# Patient Record
Sex: MALE | Race: OTHER | Employment: OTHER | URBAN - METROPOLITAN AREA
[De-identification: names, ages, dates, MRNs, and addresses within clinical notes are randomized per-mention and may not be internally consistent; named-entity substitution may affect disease eponyms.]

---

## 2020-07-13 NOTE — PATIENT DISCUSSION
This visual field clearly demonstrated a minimum of 43% loss of upper field of vision OU, with upper lid skin in repose and elevated by taping of the lid to demonstrate potential correction. This field shows that taping the lids significantly improved this patient's superior field of vision by approximately 43%, OU.

## 2020-08-12 NOTE — PATIENT DISCUSSION
Patient informed of available non-opioid medications and other non-pharmacological options. Discussed advantages and disadvantages of the alternatives, including whether the patient is at-risk for, or has a history of, controlled substance abuse or misuse, and the patient’s personal preferences. [facility] “Opioid Alternative Pamphlet” was provided to patient.

## 2020-08-25 NOTE — PATIENT DISCUSSION
Dispense and Trial Travataluz NEGRETE [facility] ou.  Reviewed Pamela NEGRETE with patient.

## 2020-08-25 NOTE — PATIENT DISCUSSION
Patient informed of available non-opioid medications and other non-pharmacological options. Discussed advantages and disadvantages of the alternatives, including whether the patient is at-risk for, or has a history of, controlled substance abuse or misuse, and the patient’s personal preferences. [facility] “Opioid Alternative Pamphlet” was provided to patient.

## 2020-08-25 NOTE — PATIENT DISCUSSION
Dispense and Trial Travataluz NEGRETE [facility] ou.  Reviewed Pamela NEGRETE with patient.

## 2020-08-26 NOTE — PATIENT DISCUSSION
Dispense and Trial Travataluz NEGRETE [facility] ou.  Reviewed Pamela NEGRETE with patient.

## 2020-09-01 NOTE — PATIENT DISCUSSION
Dispense and Trial Travataluz NEGRETE [facility] ou.  Reviewed Pamela NEGRETE with patient.

## 2021-08-17 NOTE — PATIENT DISCUSSION
Blepharoplasty Recommended.
Cataract symptoms i.e., glare, blur discussed. Pt to call if worsening vision or trouble with driving, TV, reading, ADL. UV precautions. Reviewed possibility of future cataract surgery.
Consult with Dr. Juve carrera.
Discussed condition and exacerbating conditions/situations (e.g., dry/arid environments, overhead fans, air conditioners, side effect of medications).
Discussed importance of compliance with ocular medications and follow up exams to prevent loss of vision.
Discussed lid hygiene, warm compress and eyelid wash.
Dispense and Trial Travataluz NEGRETE [facility] ou.  Reviewed Pamela NEGRETE with patient.
IOP is outside normal range in morning OU.
Instructed to call immediately if any new distortion, blurring, decreased vision or eye pain.
Monitor.
Patient made aware of 24/7 emergency services.
Patient understands condition, prognosis and need for follow up care.
See Ocular meds.
Stable.
yes...

## 2021-10-10 NOTE — PATIENT DISCUSSION
Blepharoplasty Recommended. Pt requested discharge instructions, informed MD would like to admit him for lab abnormalities and further workup. Pt angry, dressed self and began walking out of dept. PIV removed prior to departure./Walking

## 2022-04-14 ENCOUNTER — NEW PATIENT (OUTPATIENT)
Dept: URBAN - METROPOLITAN AREA CLINIC 43 | Facility: CLINIC | Age: 67
End: 2022-04-14

## 2022-04-14 DIAGNOSIS — H43.813: ICD-10-CM

## 2022-04-14 DIAGNOSIS — H25.9: ICD-10-CM

## 2022-04-14 DIAGNOSIS — H52.203: ICD-10-CM

## 2022-04-14 PROCEDURE — 92004 COMPRE OPH EXAM NEW PT 1/>: CPT

## 2022-04-14 PROCEDURE — 92015 DETERMINE REFRACTIVE STATE: CPT

## 2022-04-14 ASSESSMENT — KERATOMETRY
OS_K2POWER_DIOPTERS: 41.25
OS_K1POWER_DIOPTERS: 40.75
OD_AXISANGLE2_DEGREES: 180
OD_K1POWER_DIOPTERS: 41.00
OS_AXISANGLE_DEGREES: 89
OS_AXISANGLE2_DEGREES: 179
OD_K2POWER_DIOPTERS: 41.05
OD_AXISANGLE_DEGREES: 90

## 2022-04-14 ASSESSMENT — VISUAL ACUITY
OU_CC: J1
OS_CC: J2
OS_BAT: 20/40
OD_CC: J1
OD_SC: 20/40-1
OD_BAT: 20/30
OD_CC: 20/25+2
OU_CC: 20/20-2
OU_SC: 20/40
OS_SC: 20/40-2
OS_CC: 20/30-2

## 2022-04-14 ASSESSMENT — TONOMETRY
OS_IOP_MMHG: 15
OD_IOP_MMHG: 16

## 2023-04-19 ENCOUNTER — COMPREHENSIVE EXAM (OUTPATIENT)
Dept: URBAN - METROPOLITAN AREA CLINIC 43 | Facility: CLINIC | Age: 68
End: 2023-04-19

## 2023-04-19 DIAGNOSIS — H43.813: ICD-10-CM

## 2023-04-19 DIAGNOSIS — H52.203: ICD-10-CM

## 2023-04-19 DIAGNOSIS — H25.9: ICD-10-CM

## 2023-04-19 PROCEDURE — 92014 COMPRE OPH EXAM EST PT 1/>: CPT

## 2023-04-19 PROCEDURE — 92015 DETERMINE REFRACTIVE STATE: CPT

## 2023-04-19 ASSESSMENT — VISUAL ACUITY
OD_SC: J1
OD_CC: J1
OD_SC: 20/50-2
OS_CC: 20/30-1
OD_CC: 20/25+2
OS_SC: 20/40-1

## 2023-04-19 ASSESSMENT — KERATOMETRY
OD_AXISANGLE2_DEGREES: 180
OD_AXISANGLE_DEGREES: 90
OS_AXISANGLE2_DEGREES: 179
OS_AXISANGLE_DEGREES: 89
OD_K1POWER_DIOPTERS: 41.00
OS_K1POWER_DIOPTERS: 40.75
OS_K2POWER_DIOPTERS: 41.25
OD_K2POWER_DIOPTERS: 41.05

## 2023-04-19 ASSESSMENT — TONOMETRY
OS_IOP_MMHG: 14
OD_IOP_MMHG: 15

## 2024-12-23 ENCOUNTER — COMPREHENSIVE EXAM (OUTPATIENT)
Age: 69
End: 2024-12-23

## 2024-12-23 DIAGNOSIS — H43.813: ICD-10-CM

## 2024-12-23 DIAGNOSIS — H52.203: ICD-10-CM

## 2024-12-23 DIAGNOSIS — H25.9: ICD-10-CM

## 2024-12-23 PROCEDURE — 92015 DETERMINE REFRACTIVE STATE: CPT

## 2024-12-23 PROCEDURE — 92014 COMPRE OPH EXAM EST PT 1/>: CPT

## 2025-06-24 NOTE — PATIENT DISCUSSION
6/24/2025      Barron Gtz  [address]  [facility] [address]      Dear Colleague,    Thank you for referring your patient, Barron Gtz, to the [facility]. Please see a copy of my visit note below.    [facility] Dermatology Note  Encounter Date: Jun 24, 2025  Office Visit     Dermatology Problem List:  1. Plaque psoriasis (PSA monitored through rheum-Taltz)  - Current Tx: triamcinolone 0.1% ointment, fluocinonide 0.05% solution, Taltz 80 mg/mL injections q14 days (through rheum)  - Past Tx: Humira   2. Onychodystrophy    MHx: Liver cirrhosis.  ____________________________________________    Assessment & Plan:    # Plaque psoriasis. Improved from last visit-cleared, chronic, well controlled.   - Continue Taltz injection every 14 days. (Through rheum)  - Continue triamcinolone 0.1% ointment BID, prn  - Continue fluocinonide 0.05% solution to scalp BID, prn  - Sun protection was discussed and provided with a handout discussing recommended mineral sunscreens (zinc oxide and titanium dioxide) as well as sun protective clothing.   - labs ordered today: Quantiferon-TB      # Psoriatic arthritis, chronic   - continue to follow up with rheumatology.  - continue Taltz 80 mg/mL injections q14 days (rx through rheumatology)    # Xerosis Cutis, chronic.   - discussed gentle skin care including minimal soap use,   - start Aquaphor external ointment daily      # Purpura, arms, acute.   -Likely secondary to low platelet 2nd to chronic liver disease (reviewed labs 6/6/25)    Procedures Performed:   N/A    Follow-up: 1 year in-person, or earlier for new or changing lesions    Staff and Scribe:   Scribe Disclosure:   By signing my name below, IMarisol, attest that this documentation has been prepared under the direction and in the presence of Chiqui Schulz PA-C.  - Electronically Signed: Marisol Soliz 06/24/25       Provider Disclosure:   The documentation  Healing well. recorded by the scribe accurately reflects the services I personally performed and the decisions made by me.    All risks, benefits and alternatives were discussed with patient.  Patient is in agreement and understands the assessment and plan.  All questions were answered.  Sun Screen Education was given.   Return to Clinic annually or sooner as needed.   Chiqui Schulz PA-C   [facility]        ____________________________________________    CC: Psoriasis (Psoriasis follow up pt states things are going well)    HPI:  Mr. Barron Gtz is a(n) 54 year old male who presents today for follow-up  for psoriasis.     Patient reports a few crusty areas that are bothersome. He has been compliant withe topicals, and uses the aquaphor for his dry skin, noting it is very helpful.    Patient is otherwise feeling well, without additional skin concerns.    Labs Reviewed:  reviewed labs 6/6/25, CBC, bmp    Physical exam:  Vitals: There were no vitals taken for this visit.  GEN: This is a well developed, well-nourished male in no acute distress, in a pleasant mood.    SKIN: Total skin excluding the undergarment areas was performed. The exam included the head/face, neck, both arms, chest, back, abdomen, both legs, digits and/or nails.   - few violaceous macules on forearms  - areas of telangiectasias on upper arms and central back  - xerosis noted to face, trunk, extremities  - no psoriatic plaques noted  - No other lesions of concern on areas examined.         Medications:  Current Outpatient Medications   Medication Sig Dispense Refill     ixekizumab (TALTZ) 80 MG/ML SOAJ auto-injector Inject 1 mL (80 mg) subcutaneously every 14 days. 2 mL 5     spironolactone (ALDACTONE) 100 MG tablet Take 1 tablet (100 mg) by mouth 2 times daily. 180 tablet 3     amLODIPine (NORVASC) 2.5 MG tablet Take 2.5 mg by mouth every morning.       bisacodyl (DULCOLAX) 5 MG EC tablet Two days prior to exam take two (2)  tablets at 4pm. One day prior to exam take two (2) tablets at 4pm (Patient not taking: Reported on 5/21/2025) 4 tablet 0     diclofenac (VOLTAREN) 1 % topical gel Apply 2 g topically as needed.       famotidine (PEPCID) 40 MG tablet Take 40 mg by mouth as needed.       FLUoxetine (PROZAC) 20 MG capsule Take 20 mg by mouth every morning.       furosemide (LASIX) 40 MG tablet Take 1 tablet (40 mg) by mouth 2 times daily. 180 tablet 3     hydrOXYzine HCl (ATARAX) 50 MG tablet Take 50 mg by mouth every 4 hours as needed for anxiety (BID and 3rd PRN).       lidocaine (XYLOCAINE) 2 % external gel Apply topically as needed for moderate pain (rectal pain). 30 mL 0     medical cannabis (Patient's own supply) See Admin Instructions (The purpose of this order is to document that the patient reports taking medical cannabis.  This is not a prescription, and is not used to certify that the patient has a qualifying medical condition.)       mineral oil-hydrophilic petrolatum (AQUAPHOR) external ointment APPLY TOPICALLY AS NEEDED FOR IRRITATION 396 g 3     multivitamin w/minerals (MULTI-VITAMIN) tablet Take 1 tablet by mouth every evening.       mupirocin (BACTROBAN) 2 % external ointment Apply topically as needed. apply to nasal passage three times daily       polyethylene glycol (GOLYTELY) 236 g suspension Two days before procedure at 5PM fill first container with water. Mix and drink an 8 oz glass every 15 minutes until HALF of the container is gone. Place the remainder in the refrigerator. One day before procedure at 5PM drink second half of bowel prep. Drink an 8 oz glass every 15 minutes until it is gone. Day of procedure 6 hours before arrival time fill the 2nd container with water. Mix and drink an 8 oz glass every 15 minutes until HALF of the container is gone. Discard the remaining solution. 8000 mL 0     pregabalin (LYRICA) 50 MG capsule Take 50 mg by mouth 2 times daily       QUEtiapine (SEROQUEL) 25 MG tablet Take 50  mg by mouth at bedtime       semaglutide-weight management (WEGOVY) 0.25 MG/0.5ML pen 0.25 mg every 7 days. Friday last dose 2/7/25       SODIUM CHLORIDE 0.65 % nasal spray Use 2 sprays in each nostril every 4-6 hours for dryness.       triamcinolone (KENALOG) 0.1 % external ointment Apply topically 2 times daily To affected areas of psoriasis on the right lower leg. (Patient taking differently: Apply topically 2 times daily as needed (psoriasis). To affected areas of psoriasis on the right lower leg.) 80 g 3     No current facility-administered medications for this visit.      Past Medical History:   Patient Active Problem List   Diagnosis     Psoriasis     Moderate major depression (H)     Alcohol use disorder, severe, in early remission, dependence (H)     Anemia, unspecified type     Alcoholic cirrhosis of liver with ascites (H)     Cannabis dependence (H)     Gastroesophageal reflux disease     Moderate episode of recurrent major depressive disorder (H)     Right shoulder pain     Chronic pain of left knee     Dehydration     Hypothermia, initial encounter     Other fatigue     Acute pancreatitis, unspecified complication status, unspecified pancreatitis type     Bilateral change in hearing     Personal history of tobacco use     Esophageal varices without bleeding, unspecified esophageal varices type (H)     Past Medical History:   Diagnosis Date     Alcohol use disorder     History of withdrawl     Alcoholic cirrhosis of liver with ascites (H)     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 on close of this encounter.     Again, thank you for allowing me to participate in the care of your patient.        Sincerely,        Chiqui Schulz PA-C    Electronically  signed